# Patient Record
Sex: FEMALE
[De-identification: names, ages, dates, MRNs, and addresses within clinical notes are randomized per-mention and may not be internally consistent; named-entity substitution may affect disease eponyms.]

---

## 2017-12-02 PROBLEM — Z00.00 ENCOUNTER FOR PREVENTIVE HEALTH EXAMINATION: Status: ACTIVE | Noted: 2017-12-02

## 2021-02-09 ENCOUNTER — APPOINTMENT (OUTPATIENT)
Dept: NEUROLOGY | Facility: CLINIC | Age: 66
End: 2021-02-09

## 2021-02-12 ENCOUNTER — APPOINTMENT (OUTPATIENT)
Dept: NEUROSURGERY | Facility: CLINIC | Age: 66
End: 2021-02-12
Payer: MEDICARE

## 2021-02-12 VITALS
SYSTOLIC BLOOD PRESSURE: 150 MMHG | WEIGHT: 151 LBS | TEMPERATURE: 98.2 F | OXYGEN SATURATION: 95 % | HEART RATE: 116 BPM | HEIGHT: 60 IN | DIASTOLIC BLOOD PRESSURE: 82 MMHG | BODY MASS INDEX: 29.64 KG/M2

## 2021-02-12 DIAGNOSIS — Z78.9 OTHER SPECIFIED HEALTH STATUS: ICD-10-CM

## 2021-02-12 DIAGNOSIS — Z83.79 FAMILY HISTORY OF OTHER DISEASES OF THE DIGESTIVE SYSTEM: ICD-10-CM

## 2021-02-12 DIAGNOSIS — S22.000A WEDGE COMPRESSION FRACTURE OF UNSPECIFIED THORACIC VERTEBRA, INITIAL ENCOUNTER FOR CLOSED FRACTURE: ICD-10-CM

## 2021-02-12 DIAGNOSIS — Z86.39 PERSONAL HISTORY OF OTHER ENDOCRINE, NUTRITIONAL AND METABOLIC DISEASE: ICD-10-CM

## 2021-02-12 PROCEDURE — 99072 ADDL SUPL MATRL&STAF TM PHE: CPT

## 2021-02-12 PROCEDURE — 99203 OFFICE O/P NEW LOW 30 MIN: CPT

## 2021-02-12 RX ORDER — LEVOTHYROXINE SODIUM 0.14 MG/1
137 TABLET ORAL
Qty: 90 | Refills: 0 | Status: ACTIVE | COMMUNITY
Start: 2020-12-30

## 2021-02-12 RX ORDER — NITROFURANTOIN (MONOHYDRATE/MACROCRYSTALS) 25; 75 MG/1; MG/1
100 CAPSULE ORAL
Qty: 14 | Refills: 0 | Status: ACTIVE | COMMUNITY
Start: 2020-09-14

## 2021-02-12 RX ORDER — OXYCODONE AND ACETAMINOPHEN 5; 325 MG/1; MG/1
5-325 TABLET ORAL
Qty: 20 | Refills: 0 | Status: ACTIVE | COMMUNITY
Start: 2020-12-30

## 2021-02-14 PROBLEM — S22.000A COMPRESSION FRACTURE OF THORACIC VERTEBRA: Status: ACTIVE | Noted: 2021-02-12

## 2021-02-14 PROBLEM — Z86.39 HISTORY OF THYROID DISEASE: Status: RESOLVED | Noted: 2021-02-12 | Resolved: 2021-02-14

## 2021-02-14 PROBLEM — Z83.79 FAMILY HISTORY OF CROHN'S DISEASE: Status: ACTIVE | Noted: 2021-02-12

## 2021-02-14 PROBLEM — Z78.9 NON-SMOKER: Status: ACTIVE | Noted: 2021-02-12

## 2021-02-14 RX ORDER — LEVOTHYROXINE SODIUM 137 UG/1
137 TABLET ORAL
Refills: 0 | Status: ACTIVE | COMMUNITY

## 2021-02-14 NOTE — DATA REVIEWED
[de-identified] : Xray thoracic spine reveals DDD. Severe hyperkyphosis with wedge compression fractures of the thoracic spine

## 2021-02-14 NOTE — ASSESSMENT
[FreeTextEntry1] : Ms. Ramos presents with above history and imaging.\par \par Plan:\par \par Patient is not amenable to obtain MRI thoracic spine to assess acute vs chronic compression fractures.\par She will be fitted for a TLSO brace for comfort and to maintain alignment given her hyperkyphosis and thoracic compression fractures and to minimize her pain. \par She may follow up in 3-4 weeks to assess her recovery.\par Patient knows to call the office if there are any new or worsening symptoms.\par

## 2021-02-14 NOTE — REASON FOR VISIT
[Referred By: _________] : Patient was referred by STACIE [New Patient Visit] : a new patient visit [FreeTextEntry1] : thoracic and lower back pain

## 2021-02-14 NOTE — PHYSICAL EXAM
[General Appearance - Alert] : alert [General Appearance - In No Acute Distress] : in no acute distress [Oriented To Time, Place, And Person] : oriented to person, place, and time [Impaired Insight] : insight and judgment were intact [Affect] : the affect was normal [Person] : oriented to person [Place] : oriented to place [Time] : oriented to time [Fluency] : fluency intact [Comprehension] : comprehension intact [Cranial Nerves Optic (II)] : visual acuity intact bilaterally,  pupils equal round and reactive to light [Cranial Nerves Oculomotor (III)] : extraocular motion intact [Cranial Nerves Trigeminal (V)] : facial sensation intact symmetrically [Cranial Nerves Facial (VII)] : face symmetrical [Cranial Nerves Glossopharyngeal (IX)] : tongue and palate midline [Cranial Nerves Accessory (XI - Cranial And Spinal)] : head turning and shoulder shrug symmetric [Cranial Nerves Hypoglossal (XII)] : there was no tongue deviation with protrusion [Motor Tone] : muscle tone was normal in all four extremities [Motor Strength] : muscle strength was normal in all four extremities [Sensation Tactile Decrease] : light touch was intact [Sensation Pain / Temperature Decrease] : pain and temperature was intact [Normal] : normal [No Visual Abnormalities] : no visible abnormailities [Able to toe walk] : the patient was able to toe walk [Able to heel walk] : the patient was able to heel walk [Over the Past 2 Weeks, Have You Felt Down, Depressed, or Hopeless?] : 1.) Over the past 2 weeks, have you felt down, depressed, or hopeless? No [Over the Past 2 Weeks, Have You Felt Little Interest or Pleasure Doing Things?] : 2.) Over the past 2 weeks, have you felt little interest or pleasure doing things? No

## 2021-02-14 NOTE — REVIEW OF SYSTEMS
[Feeling Tired] : feeling tired [As Noted in HPI] : as noted in HPI [Negative] : Heme/Lymph [Numbness] : no numbness [Tingling] : no tingling [Incontinence] : no incontinence [Difficulty Walking] : no difficulty walking [FreeTextEntry9] : thoracic pain

## 2021-02-14 NOTE — HISTORY OF PRESENT ILLNESS
[< 3 months] : less than 3 months [Pain] : pain [Intermit.] : ~He/She~ states the symptoms seem to be intermittent [Bending] : worsened by bending [Lifting] : worsened by lifting [Recumbency] : relieved by recumbency [Rest] : relieved by rest [FreeTextEntry1] : thoracic and lumbar region pain  [de-identified] : ILIANA SPRAGUE is a 65 year old female presents for initial neurosurgical evaluation of thoracic/lumbar region pain. Patient was referred by Dr. Dias (PMD), recommendation of kyphoplasty.  Past medical history includes hypothyroidism.  Patient mentions her symptoms began about 2 months ago.  She endorses she sustained a injury which incited lower back pain. Patient is employed as a .  Patient presented to her PMD with her complaints, which prompted an xray of thoracic spine which revealed DDD, severe hypokyphosis with multiple thoracic compression fractures acute vs chronic.  She states she has overall improvement since seeing her PMD. \par She reports mild thoracic/lumbar pain with no radicular component. Pain intensity 4/10. Denies any saddle anesthesia, urinary or bowel dysfunction. She is ambulating independently.  \par No physical therapy or pain management.\par She manages her symptoms with Tylenol.\par \par  [Ataxia] : no ataxia [Incontinence] : no incontinence [Loss of Dexterity] : good dexterity [Urinary Ret.] : no urinary retention

## 2021-10-24 ENCOUNTER — TRANSCRIPTION ENCOUNTER (OUTPATIENT)
Age: 66
End: 2021-10-24